# Patient Record
(demographics unavailable — no encounter records)

---

## 2017-04-04 NOTE — EDPHY
H & P


Stated Complaint: Fall down stairs 1100hrs, now nausea, photophobia.


Time Seen by Provider: 04/04/17 16:28


HPI/ROS: 





CHIEF COMPLAINT: head injury





HISTORY OF PRESENT ILLNESS:  19-year-old female presents emergency department 

complaining of headache, nausea and photophobia.  Patient was going to class 

this morning when she slipped on the ice going down her front porch steps 

landing on her butt and striking the back of her head on the steps.  Patient 

unsure if she lost consciousness, she remembers falling, picking up her bag and 

going back up stairs.  Patient states she took ibuprofen for a headache and 

vomited twice.  She has photophobia, she denies neck pain, no confusion, no 

previous head injury.  Patient denies seizure-like activity.  She did not go to 

class today.  She states she originally had blurred vision and this has 

resolved.  No back pain.





REVIEW OF SYSTEMS:


A comprehensive 10 point review of systems is otherwise negative aside from 

elements mentioned in the history of present illness.


Source: Patient


Exam Limitations: No limitations





- Personal History


LMP (Females 10-55): 8-14 Days Ago


Current Tetanus/Diphtheria Vaccine: Unsure


Current Tetanus Diphtheria and Acellular Pertussis (TDAP): Unsure





- Medical/Surgical History


Hx Asthma: No


Hx Chronic Respiratory Disease: No


Hx Diabetes: No


Hx Cardiac Disease: No


Hx Renal Disease: No


Hx Cirrhosis: No


Hx Alcoholism: No


Hx HIV/AIDS: No


Hx Splenectomy or Spleen Trauma: No


Other PMH: Thyroid nodule, Treatment I131, ADHD, marijuana





- Social History


Smoking Status: Light smoker


Alcohol Use: Occasionally


Drug Use: Marijuana





- Physical Exam


Exam: 





Physical Exam


Gen: Alert and Oriented, NAD 


HEENT: PERRL, moist mucous membranes 


NECK:  No C-spine tenderness to palpation


CV: regular rate and regular rhythm


PULM: CTAB, no wheezes 


NEURO:  Alert and oriented, no facial asymmetry, follows commands, normal 

cerebellar exam


EXTREMITIES: normal appearing


SKIN: no rash or break in skin on exposed skin


PSYCH: answers questions appropriately.


Constitutional: 





 Initial Vital Signs











Temperature (C)  36.5 C   04/04/17 15:34


 


Heart Rate  88   04/04/17 15:34


 


Respiratory Rate  16   04/04/17 15:34


 


Blood Pressure  124/75 H  04/04/17 15:34


 


O2 Sat (%)  98   04/04/17 15:34








 











O2 Delivery Mode               Room Air














Allergies/Adverse Reactions: 


 





No Known Allergies Allergy (Unverified 04/04/17 15:39)


 








Home Medications: 














 Medication  Instructions  Recorded


 


Adderall 10 mg Tablet  04/04/17














Medical Decision Making





- Diagnostics


Imaging: 





CT brain independently reviewed by me-


Differential Diagnosis: 





The differential diagnosis for the patient's head injury included but was not 

limited to concussion, skull fracture, intra-parenchymal contusion, subarachnoid

, subdural and epidural hematoma.





Departure





- Departure


Disposition: Home, Routine, Self-Care


Clinical Impression: 


Head injury


Qualifiers:


 Encounter type: initial encounter Qualified Code(s): S09.90XA - Unspecified 

injury of head, initial encounter





Concussion


Qualifiers:


 Encounter type: initial encounter Loss of consciousness presence/duration: 

without LOC Qualified Code(s): S06.0X0A - Concussion without loss of 

consciousness, initial encounter





Condition: Good


Instructions:  Concussion (ED), Head Injury (ED)


Additional Instructions: 


Take 650 mg of Tylenol every 8 hours for headaches, you can also take 600 mg of 

ibuprofen every 8 hours with food for headaches for 3-5 days, you can alternate 

these every 4 hours.  Follow up with the concussion specialist this week, call 

tomorrow morning to schedule this appointment.  Return to the emergency 

department for any forceful vomiting, confusion, altered gait, seizure-like 

activity, any new symptoms or concerns.


Referrals: 


Jaci Sandra MD [Medical Doctor] - As per Instructions (Concussion 

specialist)